# Patient Record
Sex: FEMALE | Race: WHITE
[De-identification: names, ages, dates, MRNs, and addresses within clinical notes are randomized per-mention and may not be internally consistent; named-entity substitution may affect disease eponyms.]

---

## 2018-03-02 ENCOUNTER — HOSPITAL ENCOUNTER (OUTPATIENT)
Dept: HOSPITAL 92 - BICMAMMO | Age: 63
Discharge: HOME | End: 2018-03-02
Attending: FAMILY MEDICINE
Payer: MEDICARE

## 2018-03-02 DIAGNOSIS — Z78.0: ICD-10-CM

## 2018-03-02 DIAGNOSIS — Z12.31: Primary | ICD-10-CM

## 2018-03-02 DIAGNOSIS — M85.80: ICD-10-CM

## 2018-03-02 PROCEDURE — 77067 SCR MAMMO BI INCL CAD: CPT

## 2018-03-02 PROCEDURE — 77080 DXA BONE DENSITY AXIAL: CPT

## 2018-03-02 PROCEDURE — 77063 BREAST TOMOSYNTHESIS BI: CPT

## 2019-03-05 ENCOUNTER — HOSPITAL ENCOUNTER (OUTPATIENT)
Dept: HOSPITAL 92 - BICMAMMO | Age: 64
Discharge: HOME | End: 2019-03-05
Attending: FAMILY MEDICINE
Payer: MEDICARE

## 2019-03-05 DIAGNOSIS — R92.1: ICD-10-CM

## 2019-03-05 DIAGNOSIS — Z12.31: Primary | ICD-10-CM

## 2019-03-05 PROCEDURE — 77067 SCR MAMMO BI INCL CAD: CPT

## 2019-03-05 PROCEDURE — 77063 BREAST TOMOSYNTHESIS BI: CPT

## 2019-03-05 NOTE — MMO
FILMS COMPARED:

The present examination has been compared to prior imaging studies performed on

04/08/2015, 05/11/2015 and 02/23/2017.

 

MAMMOGRAM FINDINGS:

There are scattered fibroglandular densities.

 

There are benign appearing calcifications seen in both breasts.  There are no

suspicious masses, calcifications or areas of architectural distortion.

 

IMPRESSION:

CALCIFICATIONS IN BOTH BREASTS ARE BENIGN.

 

A ROUTINE FOLLOW-UP MAMMOGRAM IN 1 YEAR IS RECOMMENDED.

 

ACR BI-RADS Category 2 - Benign finding

## 2020-03-10 ENCOUNTER — HOSPITAL ENCOUNTER (OUTPATIENT)
Dept: HOSPITAL 92 - BICMAMMO | Age: 65
Discharge: HOME | End: 2020-03-10
Attending: FAMILY MEDICINE
Payer: MEDICARE

## 2020-03-10 DIAGNOSIS — Z78.0: ICD-10-CM

## 2020-03-10 DIAGNOSIS — N64.89: ICD-10-CM

## 2020-03-10 DIAGNOSIS — M85.89: ICD-10-CM

## 2020-03-10 DIAGNOSIS — R92.1: ICD-10-CM

## 2020-03-10 DIAGNOSIS — Z12.31: Primary | ICD-10-CM

## 2020-03-10 DIAGNOSIS — Z13.820: ICD-10-CM

## 2020-03-10 PROCEDURE — 77063 BREAST TOMOSYNTHESIS BI: CPT

## 2020-03-10 PROCEDURE — 77067 SCR MAMMO BI INCL CAD: CPT

## 2020-03-10 PROCEDURE — 77080 DXA BONE DENSITY AXIAL: CPT

## 2020-03-10 NOTE — BD
BONE DENSITOMETRY USING DEXA:

 

HISTORY: 

Asymptomatic menopausal state.  Postmenopausal screening for osteoporosis.

 

FINDINGS: 

 

Lumbar Spine:       BMD (g/cm2)

    L1                0.780              T-Score: -1.9  Z-Score: -0.2

    L2                0.845              T-Score: -1.7  Z-Score:  0.2

    L3                0.850              T-Score: -2.1  Z-Score: -0.1

    L4                0.870              T-Score: -1.7  Z-Score:  0.3

 

    L1-L4             0.837              T-Score: -1.9  Z-Score:  0.0

 

Femoral Neck:         0.830              T-Score: -0.2  Z-Score:  1.5

 

Total Femur:          0.769              T-Score: -1.4  Z-Score: -0.1

 

The 10-year fracture risk for a major osteoporotic fracture is 7.1% and for a hip fracture is 0.3%.

 

Impression:

Osteopenia.

 

POS: TPC

## 2020-03-10 NOTE — MMO
Bilateral MAMMO Bilat Screen [facility].

 

CLINICAL HISTORY:

Patient is 65 years old and is seen for screening. The patient has no family

history of breast cancer.  The patient has no personal history of cancer.

 

VIEWS:

The views performed were:  bilateral craniocaudal with tomosynthesis and

bilateral mediolateral oblique with tomosynthesis.

 

FILMS COMPARED:

The present examination has been compared to prior imaging studies performed at

[facility] on 03/02/2018 and 03/05/2019, and at [facility]

on 02/23/2017.

 

This study has been interpreted with the assistance of computer-aided detection.

 

MAMMOGRAM FINDINGS:

There are scattered fibroglandular densities.

 

Finding 1:  There are stable benign appearing calcifications seen in both

breasts.

 

Finding 2:  There is an asymmetry measuring 5 millimeters seen in the MLO view

only seen in the lower-inner region of the left breast.

 

Finding 3:  There is a stable intramammary lymph node seen in the upper-outer

region of the left breast.

 

IMPRESSION:

FINDING 1:  STABLE CALCIFICATIONS IN BOTH BREASTS ARE BENIGN.

 

FINDING 2:  ASYMMETRY IN THE LOWER-INNER REGION OF THE LEFT BREAST REQUIRES

ADDITIONAL EVALUATION. ADDITIONAL PROJECTIONS (LEFT CRANIOCAUDAL SPOT

COMPRESSION; LEFT MEDIOLATERAL OBLIQUE SPOT COMPRESSION; AND LEFT LATEROMEDIAL)

ARE RECOMMENDED. AN ULTRASOUND EXAM IS RECOMMENDED IF NEEDED. ADDITIONAL IMAGING.

 

FINDING 3:  STABLE INTRAMAMMARY LYMPH NODE IN THE UPPER-OUTER REGION OF THE LEFT

BREAST IS BENIGN.

 

THE RESULTS OF THIS EXAM WERE SENT TO THE PATIENT.

 

ACR BI-RADS Category 0 - Incomplete:  Need additional imaging evaluation. [facility] will notify the patient of the need for additional imaging services.

 

MAMMOGRAPHY NOTE:

 1. A negative mammogram report should not delay a biopsy if a dominant of

 clinically suspicious mass is present.

 2. Approximately 10% to 15% of breast cancers are not detected by

 mammography.

 3. Adenosis and dense breasts may obscure an underlying neoplasm.

 

 

Reported by: NIALL FLEMING MD

Electonically Signed: [id number]

## 2020-03-12 ENCOUNTER — HOSPITAL ENCOUNTER (OUTPATIENT)
Dept: HOSPITAL 92 - BICMAMMO | Age: 65
Discharge: HOME | End: 2020-03-12
Attending: FAMILY MEDICINE
Payer: MEDICARE

## 2020-03-12 DIAGNOSIS — R92.2: Primary | ICD-10-CM

## 2020-03-12 PROCEDURE — G0279 TOMOSYNTHESIS, MAMMO: HCPCS

## 2020-03-12 PROCEDURE — 77065 DX MAMMO INCL CAD UNI: CPT

## 2020-03-12 NOTE — MMO
Left Breast MAMMO Unilat Diag [facility].

 

CLINICAL HISTORY:

Patient is 65 years old and is seen for additional evaluation requested from

prior study. The patient has no family history of breast cancer.  The patient

has no personal history of cancer.

 

VIEWS:

The views performed were:  left craniocaudal spot compression with

tomosynthesis; left mediolateral oblique spot compression with tomosynthesis;

and left mediolateral with tomosynthesis.

 

FILMS COMPARED:

The present examination has been compared to prior imaging studies performed at

[facility] on 03/02/2018, 03/05/2019 and 03/10/2020.

 

This study has been interpreted with the assistance of computer-aided detection.

 

MAMMOGRAM FINDINGS:

There are scattered fibroglandular densities.

 

The asymmetry seen at screening mammography does not persist at additional

imaging, compatible with superimposed tissue.

 

There are no suspicious masses, suspicious calcifications, or new areas of

architectural distortion.

 

IMPRESSION:

THERE IS NO MAMMOGRAPHIC EVIDENCE OF MALIGNANCY.

 

A ROUTINE FOLLOW-UP MAMMOGRAM IN 1 YEAR IS RECOMMENDED.

 

THE RESULTS OF THIS EXAM WERE SENT TO THE PATIENT.

 

ACR BI-RADS Category 2 - Benign finding

 

MAMMOGRAPHY NOTE:

 1. A negative mammogram report should not delay a biopsy if a dominant of

 clinically suspicious mass is present.

 2. Approximately 10% to 15% of breast cancers are not detected by

 mammography.

 3. Adenosis and dense breasts may obscure an underlying neoplasm.

 

 

Reported by: ALBERT PATRICIA MD

Electonically Signed: [id number]

## 2020-06-18 ENCOUNTER — HOSPITAL ENCOUNTER (OUTPATIENT)
Dept: HOSPITAL 92 - LABBT | Age: 65
Discharge: HOME | End: 2020-06-18
Attending: ORTHOPAEDIC SURGERY
Payer: MEDICARE

## 2020-06-18 DIAGNOSIS — Z01.818: Primary | ICD-10-CM

## 2020-06-18 DIAGNOSIS — M17.12: ICD-10-CM

## 2020-06-18 DIAGNOSIS — Z11.59: ICD-10-CM

## 2020-06-18 LAB
ANION GAP SERPL CALC-SCNC: 14 MMOL/L (ref 10–20)
BASOPHILS # BLD AUTO: 0 THOU/UL (ref 0–0.2)
BASOPHILS NFR BLD AUTO: 1.1 % (ref 0–1)
BUN SERPL-MCNC: 17 MG/DL (ref 9.8–20.1)
CALCIUM SERPL-MCNC: 9.9 MG/DL (ref 7.8–10.44)
CHLORIDE SERPL-SCNC: 103 MMOL/L (ref 98–107)
CO2 SERPL-SCNC: 24 MMOL/L (ref 23–31)
CREAT CL PREDICTED SERPL C-G-VRATE: 0 ML/MIN (ref 70–130)
EOSINOPHIL # BLD AUTO: 0.1 THOU/UL (ref 0–0.7)
EOSINOPHIL NFR BLD AUTO: 2 % (ref 0–10)
GLUCOSE SERPL-MCNC: 102 MG/DL (ref 80–115)
HGB BLD-MCNC: 13.8 G/DL (ref 12–16)
INR PPP: 1
LYMPHOCYTES # BLD: 1.1 THOU/UL (ref 1.2–3.4)
LYMPHOCYTES NFR BLD AUTO: 31 % (ref 21–51)
MCH RBC QN AUTO: 33.3 PG (ref 27–31)
MCV RBC AUTO: 101 FL (ref 78–98)
MONOCYTES # BLD AUTO: 0.2 THOU/UL (ref 0.11–0.59)
MONOCYTES NFR BLD AUTO: 6.8 % (ref 0–10)
NEUTROPHILS # BLD AUTO: 2.1 THOU/UL (ref 1.4–6.5)
NEUTROPHILS NFR BLD AUTO: 59.1 % (ref 42–75)
PLATELET # BLD AUTO: 270 THOU/UL (ref 130–400)
POTASSIUM SERPL-SCNC: 4.3 MMOL/L (ref 3.5–5.1)
PROTHROMBIN TIME: 12.8 SEC (ref 12–14.7)
RBC # BLD AUTO: 4.13 MILL/UL (ref 4.2–5.4)
RBC UR QL AUTO: (no result) HPF (ref 0–3)
SODIUM SERPL-SCNC: 137 MMOL/L (ref 136–145)
WBC # BLD AUTO: 3.5 THOU/UL (ref 4.8–10.8)

## 2020-06-18 PROCEDURE — 80048 BASIC METABOLIC PNL TOTAL CA: CPT

## 2020-06-18 PROCEDURE — 81001 URINALYSIS AUTO W/SCOPE: CPT

## 2020-06-18 PROCEDURE — 87081 CULTURE SCREEN ONLY: CPT

## 2020-06-18 PROCEDURE — 85610 PROTHROMBIN TIME: CPT

## 2020-06-18 PROCEDURE — 87635 SARS-COV-2 COVID-19 AMP PRB: CPT

## 2020-06-18 PROCEDURE — U0003 INFECTIOUS AGENT DETECTION BY NUCLEIC ACID (DNA OR RNA); SEVERE ACUTE RESPIRATORY SYNDROME CORONAVIRUS 2 (SARS-COV-2) (CORONAVIRUS DISEASE [COVID-19]), AMPLIFIED PROBE TECHNIQUE, MAKING USE OF HIGH THROUGHPUT TECHNOLOGIES AS DESCRIBED BY CMS-2020-01-R: HCPCS

## 2020-06-18 PROCEDURE — 85025 COMPLETE CBC W/AUTO DIFF WBC: CPT

## 2023-12-01 ENCOUNTER — HOSPITAL ENCOUNTER (OUTPATIENT)
Dept: HOSPITAL 92 - CSHMRI | Age: 68
Discharge: HOME | End: 2023-12-01
Attending: ORTHOPAEDIC SURGERY
Payer: MEDICARE

## 2023-12-01 DIAGNOSIS — M19.011: ICD-10-CM

## 2023-12-01 DIAGNOSIS — M75.101: Primary | ICD-10-CM

## 2023-12-01 DIAGNOSIS — M75.121: ICD-10-CM

## 2023-12-01 DIAGNOSIS — M25.811: ICD-10-CM

## 2023-12-01 DIAGNOSIS — S43.431A: ICD-10-CM
